# Patient Record
Sex: FEMALE | Race: ASIAN | NOT HISPANIC OR LATINO | URBAN - METROPOLITAN AREA
[De-identification: names, ages, dates, MRNs, and addresses within clinical notes are randomized per-mention and may not be internally consistent; named-entity substitution may affect disease eponyms.]

---

## 2017-12-11 ENCOUNTER — INPATIENT (INPATIENT)
Facility: HOSPITAL | Age: 22
LOS: 1 days | Discharge: HOME | End: 2017-12-13
Attending: OBSTETRICS & GYNECOLOGY | Admitting: OBSTETRICS & GYNECOLOGY
Payer: MEDICAID

## 2017-12-11 VITALS
DIASTOLIC BLOOD PRESSURE: 50 MMHG | HEART RATE: 92 BPM | TEMPERATURE: 98 F | RESPIRATION RATE: 16 BRPM | SYSTOLIC BLOOD PRESSURE: 92 MMHG | OXYGEN SATURATION: 100 %

## 2017-12-11 LAB
ALBUMIN SERPL ELPH-MCNC: 4.2 G/DL — SIGNIFICANT CHANGE UP (ref 3.3–5)
ALP SERPL-CCNC: 39 U/L — LOW (ref 40–120)
ALT FLD-CCNC: 7 U/L — SIGNIFICANT CHANGE UP (ref 4–33)
APPEARANCE UR: CLEAR — SIGNIFICANT CHANGE UP
APTT BLD: 27.5 SEC — SIGNIFICANT CHANGE UP (ref 27.5–37.4)
AST SERPL-CCNC: 16 U/L — SIGNIFICANT CHANGE UP (ref 4–32)
BACTERIA # UR AUTO: SIGNIFICANT CHANGE UP
BASOPHILS # BLD AUTO: 0.01 K/UL — SIGNIFICANT CHANGE UP (ref 0–0.2)
BASOPHILS NFR BLD AUTO: 0.1 % — SIGNIFICANT CHANGE UP (ref 0–2)
BILIRUB SERPL-MCNC: < 0.2 MG/DL — LOW (ref 0.2–1.2)
BILIRUB UR-MCNC: NEGATIVE — SIGNIFICANT CHANGE UP
BLD GP AB SCN SERPL QL: NEGATIVE — SIGNIFICANT CHANGE UP
BLOOD UR QL VISUAL: NEGATIVE — SIGNIFICANT CHANGE UP
BUN SERPL-MCNC: 3 MG/DL — LOW (ref 7–23)
CALCIUM SERPL-MCNC: 8.7 MG/DL — SIGNIFICANT CHANGE UP (ref 8.4–10.5)
CHLORIDE SERPL-SCNC: 101 MMOL/L — SIGNIFICANT CHANGE UP (ref 98–107)
CO2 SERPL-SCNC: 24 MMOL/L — SIGNIFICANT CHANGE UP (ref 22–31)
COLOR SPEC: YELLOW — SIGNIFICANT CHANGE UP
CREAT SERPL-MCNC: 0.54 MG/DL — SIGNIFICANT CHANGE UP (ref 0.5–1.3)
EOSINOPHIL # BLD AUTO: 0.06 K/UL — SIGNIFICANT CHANGE UP (ref 0–0.5)
EOSINOPHIL NFR BLD AUTO: 0.7 % — SIGNIFICANT CHANGE UP (ref 0–6)
GLUCOSE SERPL-MCNC: 120 MG/DL — HIGH (ref 70–99)
GLUCOSE UR-MCNC: 50 — HIGH
HCG SERPL-ACNC: SIGNIFICANT CHANGE UP MIU/ML
HCT VFR BLD CALC: 25.4 % — LOW (ref 34.5–45)
HGB BLD-MCNC: 8.5 G/DL — LOW (ref 11.5–15.5)
HYALINE CASTS # UR AUTO: SIGNIFICANT CHANGE UP (ref 0–?)
IMM GRANULOCYTES # BLD AUTO: 0.03 # — SIGNIFICANT CHANGE UP
IMM GRANULOCYTES NFR BLD AUTO: 0.3 % — SIGNIFICANT CHANGE UP (ref 0–1.5)
INR BLD: 0.91 — SIGNIFICANT CHANGE UP (ref 0.88–1.17)
KETONES UR-MCNC: NEGATIVE — SIGNIFICANT CHANGE UP
LEUKOCYTE ESTERASE UR-ACNC: NEGATIVE — SIGNIFICANT CHANGE UP
LYMPHOCYTES # BLD AUTO: 1 K/UL — SIGNIFICANT CHANGE UP (ref 1–3.3)
LYMPHOCYTES # BLD AUTO: 10.9 % — LOW (ref 13–44)
MCHC RBC-ENTMCNC: 29.8 PG — SIGNIFICANT CHANGE UP (ref 27–34)
MCHC RBC-ENTMCNC: 33.5 % — SIGNIFICANT CHANGE UP (ref 32–36)
MCV RBC AUTO: 89.1 FL — SIGNIFICANT CHANGE UP (ref 80–100)
MONOCYTES # BLD AUTO: 0.42 K/UL — SIGNIFICANT CHANGE UP (ref 0–0.9)
MONOCYTES NFR BLD AUTO: 4.6 % — SIGNIFICANT CHANGE UP (ref 2–14)
MUCOUS THREADS # UR AUTO: SIGNIFICANT CHANGE UP
NEUTROPHILS # BLD AUTO: 7.63 K/UL — HIGH (ref 1.8–7.4)
NEUTROPHILS NFR BLD AUTO: 83.4 % — HIGH (ref 43–77)
NITRITE UR-MCNC: NEGATIVE — SIGNIFICANT CHANGE UP
NON-SQ EPI CELLS # UR AUTO: <1 — SIGNIFICANT CHANGE UP
NRBC # FLD: 0 — SIGNIFICANT CHANGE UP
PH UR: 6.5 — SIGNIFICANT CHANGE UP (ref 4.6–8)
PLATELET # BLD AUTO: 143 K/UL — LOW (ref 150–400)
PMV BLD: 12.4 FL — SIGNIFICANT CHANGE UP (ref 7–13)
POTASSIUM SERPL-MCNC: 3.6 MMOL/L — SIGNIFICANT CHANGE UP (ref 3.5–5.3)
POTASSIUM SERPL-SCNC: 3.6 MMOL/L — SIGNIFICANT CHANGE UP (ref 3.5–5.3)
PROT SERPL-MCNC: 6.3 G/DL — SIGNIFICANT CHANGE UP (ref 6–8.3)
PROT UR-MCNC: 20 MG/DL — SIGNIFICANT CHANGE UP
PROTHROM AB SERPL-ACNC: 10.1 SEC — SIGNIFICANT CHANGE UP (ref 9.8–13.1)
RBC # BLD: 2.85 M/UL — LOW (ref 3.8–5.2)
RBC # FLD: 13.1 % — SIGNIFICANT CHANGE UP (ref 10.3–14.5)
RBC CASTS # UR COMP ASSIST: SIGNIFICANT CHANGE UP (ref 0–?)
RH IG SCN BLD-IMP: POSITIVE — SIGNIFICANT CHANGE UP
SODIUM SERPL-SCNC: 140 MMOL/L — SIGNIFICANT CHANGE UP (ref 135–145)
SP GR SPEC: 1.01 — SIGNIFICANT CHANGE UP (ref 1–1.04)
SQUAMOUS # UR AUTO: SIGNIFICANT CHANGE UP
UROBILINOGEN FLD QL: NORMAL MG/DL — SIGNIFICANT CHANGE UP
WBC # BLD: 9.15 K/UL — SIGNIFICANT CHANGE UP (ref 3.8–10.5)
WBC # FLD AUTO: 9.15 K/UL — SIGNIFICANT CHANGE UP (ref 3.8–10.5)
WBC UR QL: SIGNIFICANT CHANGE UP (ref 0–?)

## 2017-12-11 PROCEDURE — 76830 TRANSVAGINAL US NON-OB: CPT | Mod: 26

## 2017-12-11 RX ORDER — SODIUM CHLORIDE 9 MG/ML
1000 INJECTION INTRAMUSCULAR; INTRAVENOUS; SUBCUTANEOUS ONCE
Qty: 0 | Refills: 0 | Status: COMPLETED | OUTPATIENT
Start: 2017-12-11 | End: 2017-12-11

## 2017-12-11 RX ADMIN — SODIUM CHLORIDE 1000 MILLILITER(S): 9 INJECTION INTRAMUSCULAR; INTRAVENOUS; SUBCUTANEOUS at 21:09

## 2017-12-11 NOTE — ED ADULT NURSE NOTE - OBJECTIVE STATEMENT
Pt received in room 4, chinese speaking only. Pt comes in s/p syncope today, with c/o LLQ pain that began after the syncope. Pt appears in no acute distress, vs as noted and reports that she has 3/10 pain. Pt denies hitting head or falling to floor, was caught by family member before hitting the ground. IV access placed to RAC 20G and NS infusing per order. Labs sent, awaiting urine sample and pt awaiting TVUS. Will monitor and await further orders.

## 2017-12-11 NOTE — ED PROVIDER NOTE - CRITICAL CARE PROVIDED
direct patient care (not related to procedure)/documentation/interpretation of diagnostic studies/consult w/ pt's family directly relating to pts condition/additional history taking/consultation with other physicians

## 2017-12-11 NOTE — ED PROVIDER NOTE - MEDICAL DECISION MAKING DETAILS
23 yo F here for pelvic pain, bleeding, currently 8 weeks pregnant. Pt declining pelvic exam in ED but is amendable to TVUS. Will obtain labs, urine, TVUS

## 2017-12-11 NOTE — ED PROVIDER NOTE - OBJECTIVE STATEMENT
23 yo F  currently 8 weeks pregnant, here for vaginal bleeding, L sided pelvic pain x 2 days. Pt requesting family member to translate at bedside. Reports over past two days patient has had vaginal bleeding, approx same as period with occasional clots. Reports this morning was standing washing dishes and felt like she was going to pass out so laid on floor but no LOC. pt reports GYN in [place] Dr. Cobb. Denies fever chills vomiting diarrhea cp sob Ha dizziness dysuria hematuria weakness

## 2017-12-11 NOTE — ED ADULT TRIAGE NOTE - CHIEF COMPLAINT QUOTE
approx 8 weeks pregnant with c/o lower abdominal pain with vaginal bleeding today, denies any clots. States bleeding initially started on Friday on and off and resolved until today.

## 2017-12-11 NOTE — ED PROVIDER NOTE - SHIFT CHANGE DETAILS
f/u repeat CBC, continue to monitor vital signs, f/u GYn attending evaluation, and transfuse if necessary.

## 2017-12-11 NOTE — ED PROVIDER NOTE - CARE PLAN
Principal Discharge DX:	Subchorionic hematoma in first trimester, single or unspecified fetus  Secondary Diagnosis:	Vaginal bleeding  Secondary Diagnosis:	Hypotension, unspecified hypotension type

## 2017-12-11 NOTE — ED PROVIDER NOTE - ATTENDING CONTRIBUTION TO CARE
Joseph: 21 yo female  approx 8 weeks pregnant s/p syncope earlier today. No head trauma. Pt also c/o intermittent vaginal bleeding x 1 month. +lower abdominal cramping. No associated, dysuria, fever or chills. No flank pain. +confirmed IUP on US in OB office 3 days ago. Exam: + LLQ TTP, no CVA TTP, PLan: cbc, cmp, hcg, type and screen, u/a, transvaginal ultrasound.

## 2017-12-11 NOTE — CONSULT NOTE ADULT - SUBJECTIVE AND OBJECTIVE BOX
22y  LMP September, unknown EDC (states she is 8 weeks GA) presents to ED after near-syncopal episode at home while washing dishes. States no LOC. Occasional abdominal pain, however none currently. States she has VB intermittently, states it is minimal. Feels weakness for 2 months. Patient had confirmed IUP w/Dr. Cobb in [place].  Denies N/V/HA/SOB.       OB/GYN HISTORY: MAB 2017 w/D+C, Denies GYN history  PAST MEDICAL & SURGICAL HISTORY:  No pertinent past medical history  No significant past surgical history    Allergies: None         Vital Signs Last 24 Hrs  T(C): 36.7 (11 Dec 2017 19:44), Max: 36.7 (11 Dec 2017 19:44)  T(F): 98.1 (11 Dec 2017 19:44), Max: 98.1 (11 Dec 2017 19:44)  HR: 84 (11 Dec 2017 20:48) (84 - 92)  BP: 98/50 (11 Dec 2017 20:48) (92/50 - 98/50)  BP(mean): --  RR: 16 (11 Dec 2017 20:48) (16 - 16)  SpO2: 100% (11 Dec 2017 20:48) (100% - 100%)    PHYSICAL EXAM:      Constitutional: alert and oriented x 3  Respiratory: clear  Cardiovascular: regular rate and rhythm  Gastrointestinal: soft, non tender, + bowel sounds. No hepatosplenomegaly, no palpable masses  Cervix: closed/ long, no CMT  Uterus: normal size, non tender  Adnexa: non tender, no palpable masses        LABS:                        8.5    9.15  )-----------( 143      ( 11 Dec 2017 21:05 )             25.4     12-11    140  |  101  |  3<L>  ----------------------------<  120<H>  3.6   |  24  |  0.54    Ca    8.7      11 Dec 2017 21:05    TPro  6.3  /  Alb  4.2  /  TBili  < 0.2<L>  /  DBili  x   /  AST  16  /  ALT  7   /  AlkPhos  39<L>  12-11      Urinalysis Basic - ( 11 Dec 2017 21:15 )    Color: YELLOW / Appearance: CLEAR / S.015 / pH: 6.5  Gluc: 50 / Ketone: NEGATIVE  / Bili: NEGATIVE / Urobili: NORMAL mg/dL   Blood: NEGATIVE / Protein: 20 mg/dL / Nitrite: NEGATIVE   Leuk Esterase: NEGATIVE / RBC: 0-2 / WBC 2-5   Sq Epi: OCC / Non Sq Epi: x / Bacteria: FEW        Blood Type: B Positive      RADIOLOGY & ADDITIONAL STUDIES:  Awaiting official read: large subchorionic hematoma noted. 22y  LMP 17 (10week,3days),presents to ED after near-syncopal episode at home while washing dishes. States no LOC. Occasional abdominal pain, however none currently. States she has VB intermittently, states it is minimal. Feels weakness for 2 months. Patient had confirmed IUP w/Dr. Cobb in [place].  Denies N/V/HA/SOB.       OB/GYN HISTORY: MAB 2017 w/D+C, Denies GYN history  PAST MEDICAL & SURGICAL HISTORY:  No pertinent past medical history  No significant past surgical history    Allergies: None         Vital Signs Last 24 Hrs  T(C): 36.7 (11 Dec 2017 19:44), Max: 36.7 (11 Dec 2017 19:44)  T(F): 98.1 (11 Dec 2017 19:44), Max: 98.1 (11 Dec 2017 19:44)  HR: 84 (11 Dec 2017 20:48) (84 - 92)  BP: 98/50 (11 Dec 2017 20:48) (92/50 - 98/50)  BP(mean): --  RR: 16 (11 Dec 2017 20:48) (16 - 16)  SpO2: 100% (11 Dec 2017 20:48) (100% - 100%)    PHYSICAL EXAM:      Constitutional: alert and oriented x 3  Respiratory: clear  Cardiovascular: regular rate and rhythm  Gastrointestinal: soft, non tender, + bowel sounds. No hepatosplenomegaly, no palpable masses  Cervix: closed/ long, no CMT  Uterus: normal size, non tender  Adnexa: non tender, no palpable masses        LABS:                        8.5    9.15  )-----------( 143      ( 11 Dec 2017 21:05 )             25.4     12-11    140  |  101  |  3<L>  ----------------------------<  120<H>  3.6   |  24  |  0.54    Ca    8.7      11 Dec 2017 21:05    TPro  6.3  /  Alb  4.2  /  TBili  < 0.2<L>  /  DBili  x   /  AST  16  /  ALT  7   /  AlkPhos  39<L>  12-11      Urinalysis Basic - ( 11 Dec 2017 21:15 )    Color: YELLOW / Appearance: CLEAR / S.015 / pH: 6.5  Gluc: 50 / Ketone: NEGATIVE  / Bili: NEGATIVE / Urobili: NORMAL mg/dL   Blood: NEGATIVE / Protein: 20 mg/dL / Nitrite: NEGATIVE   Leuk Esterase: NEGATIVE / RBC: 0-2 / WBC 2-5   Sq Epi: OCC / Non Sq Epi: x / Bacteria: FEW        Blood Type: B Positive      RADIOLOGY & ADDITIONAL STUDIES:  Awaiting official read: large subchorionic hematoma noted.

## 2017-12-11 NOTE — ED PROVIDER NOTE - CHPI ED SYMPTOMS NEG
no vomiting/no fever/no chills/no decreased eating/drinking/no tingling/no dizziness/no nausea/no numbness/no weakness

## 2017-12-11 NOTE — CONSULT NOTE ADULT - ASSESSMENT
A/P   22y   @8wks GA   presents with c/o near syncopal episode, vaginal bleeding w/low H/H.  Differential  includes threatened AB vs. viable IUP.    -Subchorionic Hematoma noted. Pelvic rest. Nothing per vagina  -Rh type:   B+  No need for rhogam at this time.   -Repeat CBC to determine stability  -Follow up w/GYN     JOSH Jay, PGY2  D/W Dr. Pack A/P   22y  @10wks GA   presents with c/o near syncopal episode, vaginal bleeding w/low H/H.  Differential  includes threatened AB vs. viable IUP.    -Subchorionic Hematoma noted. Pelvic rest. Nothing per vagina  -Rh type:   B+  No need for rhogam at this time.   -Repeat CBC to determine stability  -Follow up w/GYN     JOSH Jay, PGY2  D/W Dr. Pack

## 2017-12-12 DIAGNOSIS — O41.8X10 OTHER SPECIFIED DISORDERS OF AMNIOTIC FLUID AND MEMBRANES, FIRST TRIMESTER, NOT APPLICABLE OR UNSPECIFIED: ICD-10-CM

## 2017-12-12 DIAGNOSIS — N93.9 ABNORMAL UTERINE AND VAGINAL BLEEDING, UNSPECIFIED: ICD-10-CM

## 2017-12-12 LAB
HCT VFR BLD CALC: 24.5 % — LOW (ref 34.5–45)
HCT VFR BLD CALC: 25.5 % — LOW (ref 34.5–45)
HCT VFR BLD CALC: 35.6 % — SIGNIFICANT CHANGE UP (ref 34.5–45)
HGB BLD-MCNC: 12 G/DL — SIGNIFICANT CHANGE UP (ref 11.5–15.5)
HGB BLD-MCNC: 8.2 G/DL — LOW (ref 11.5–15.5)
HGB BLD-MCNC: 8.7 G/DL — LOW (ref 11.5–15.5)
MCHC RBC-ENTMCNC: 29.6 PG — SIGNIFICANT CHANGE UP (ref 27–34)
MCHC RBC-ENTMCNC: 30 PG — SIGNIFICANT CHANGE UP (ref 27–34)
MCHC RBC-ENTMCNC: 30.1 PG — SIGNIFICANT CHANGE UP (ref 27–34)
MCHC RBC-ENTMCNC: 33.5 % — SIGNIFICANT CHANGE UP (ref 32–36)
MCHC RBC-ENTMCNC: 33.7 % — SIGNIFICANT CHANGE UP (ref 32–36)
MCHC RBC-ENTMCNC: 34.1 % — SIGNIFICANT CHANGE UP (ref 32–36)
MCV RBC AUTO: 87.9 FL — SIGNIFICANT CHANGE UP (ref 80–100)
MCV RBC AUTO: 88.2 FL — SIGNIFICANT CHANGE UP (ref 80–100)
MCV RBC AUTO: 89.7 FL — SIGNIFICANT CHANGE UP (ref 80–100)
NRBC # FLD: 0 — SIGNIFICANT CHANGE UP
PLATELET # BLD AUTO: 109 K/UL — LOW (ref 150–400)
PLATELET # BLD AUTO: 141 K/UL — LOW (ref 150–400)
PLATELET # BLD AUTO: 157 K/UL — SIGNIFICANT CHANGE UP (ref 150–400)
PMV BLD: 12 FL — SIGNIFICANT CHANGE UP (ref 7–13)
PMV BLD: 12 FL — SIGNIFICANT CHANGE UP (ref 7–13)
PMV BLD: 12.5 FL — SIGNIFICANT CHANGE UP (ref 7–13)
RBC # BLD: 2.73 M/UL — LOW (ref 3.8–5.2)
RBC # BLD: 2.89 M/UL — LOW (ref 3.8–5.2)
RBC # BLD: 4.05 M/UL — SIGNIFICANT CHANGE UP (ref 3.8–5.2)
RBC # FLD: 13.2 % — SIGNIFICANT CHANGE UP (ref 10.3–14.5)
RBC # FLD: 13.7 % — SIGNIFICANT CHANGE UP (ref 10.3–14.5)
RBC # FLD: 13.9 % — SIGNIFICANT CHANGE UP (ref 10.3–14.5)
RH IG SCN BLD-IMP: POSITIVE — SIGNIFICANT CHANGE UP
WBC # BLD: 10.33 K/UL — SIGNIFICANT CHANGE UP (ref 3.8–10.5)
WBC # BLD: 7.13 K/UL — SIGNIFICANT CHANGE UP (ref 3.8–10.5)
WBC # BLD: 9.88 K/UL — SIGNIFICANT CHANGE UP (ref 3.8–10.5)
WBC # FLD AUTO: 10.33 K/UL — SIGNIFICANT CHANGE UP (ref 3.8–10.5)
WBC # FLD AUTO: 7.13 K/UL — SIGNIFICANT CHANGE UP (ref 3.8–10.5)
WBC # FLD AUTO: 9.88 K/UL — SIGNIFICANT CHANGE UP (ref 3.8–10.5)

## 2017-12-12 PROCEDURE — 99232 SBSQ HOSP IP/OBS MODERATE 35: CPT | Mod: GC

## 2017-12-12 RX ORDER — SODIUM CHLORIDE 9 MG/ML
1000 INJECTION INTRAMUSCULAR; INTRAVENOUS; SUBCUTANEOUS ONCE
Qty: 0 | Refills: 0 | Status: COMPLETED | OUTPATIENT
Start: 2017-12-12 | End: 2017-12-12

## 2017-12-12 RX ORDER — DIPHENHYDRAMINE HCL 50 MG
25 CAPSULE ORAL ONCE
Qty: 0 | Refills: 0 | Status: COMPLETED | OUTPATIENT
Start: 2017-12-12 | End: 2017-12-12

## 2017-12-12 RX ORDER — INFLUENZA VIRUS VACCINE 15; 15; 15; 15 UG/.5ML; UG/.5ML; UG/.5ML; UG/.5ML
0.5 SUSPENSION INTRAMUSCULAR ONCE
Qty: 0 | Refills: 0 | Status: DISCONTINUED | OUTPATIENT
Start: 2017-12-12 | End: 2017-12-13

## 2017-12-12 RX ORDER — ACETAMINOPHEN 500 MG
975 TABLET ORAL ONCE
Qty: 0 | Refills: 0 | Status: COMPLETED | OUTPATIENT
Start: 2017-12-12 | End: 2017-12-12

## 2017-12-12 RX ORDER — SODIUM CHLORIDE 9 MG/ML
1000 INJECTION, SOLUTION INTRAVENOUS
Qty: 0 | Refills: 0 | Status: DISCONTINUED | OUTPATIENT
Start: 2017-12-12 | End: 2017-12-12

## 2017-12-12 RX ORDER — ACETAMINOPHEN 500 MG
650 TABLET ORAL EVERY 6 HOURS
Qty: 0 | Refills: 0 | Status: DISCONTINUED | OUTPATIENT
Start: 2017-12-12 | End: 2017-12-13

## 2017-12-12 RX ADMIN — SODIUM CHLORIDE 1000 MILLILITER(S): 9 INJECTION INTRAMUSCULAR; INTRAVENOUS; SUBCUTANEOUS at 00:12

## 2017-12-12 RX ADMIN — SODIUM CHLORIDE 1000 MILLILITER(S): 9 INJECTION INTRAMUSCULAR; INTRAVENOUS; SUBCUTANEOUS at 01:31

## 2017-12-12 RX ADMIN — SODIUM CHLORIDE 125 MILLILITER(S): 9 INJECTION, SOLUTION INTRAVENOUS at 02:25

## 2017-12-12 RX ADMIN — Medication 25 MILLIGRAM(S): at 03:53

## 2017-12-12 RX ADMIN — Medication 975 MILLIGRAM(S): at 03:53

## 2017-12-12 NOTE — DISCHARGE NOTE ADULT - PATIENT PORTAL LINK FT
“You can access the [facility] Patient Portal, offered by Health system, by registering with the following website: [email or web address]”

## 2017-12-12 NOTE — H&P ADULT - HISTORY OF PRESENT ILLNESS
22y  LMP 17 (10week,3days),presents to ED after near-syncopal episode at home while washing dishes. States no LOC. Occasional abdominal pain, however none currently. States she has VB intermittently, states it is minimal. Has felt weakness for 2 months. Patient had confirmed IUP w/Dr. Cobb in [place].  Denies N/V/HA/SOB.     While in ED, patient saturated 2 pads and was hypotensive.    OB/GYN HISTORY: eTOP 2017 w/D+C, Denies GYN history  PAST MEDICAL & SURGICAL HISTORY:  No pertinent past medical history  D+C x 1    Allergies: None

## 2017-12-12 NOTE — DISCHARGE NOTE ADULT - INSTRUCTIONS
drink 9-13 eight oz. glasses of fluid daily. call md for follow up appt. call md for return of symptoms or go to the ER for emergency. regular

## 2017-12-12 NOTE — H&P ADULT - NSHPPHYSICALEXAM_GEN_ALL_CORE
Constitutional: alert and oriented x 3  Respiratory: clear  Cardiovascular: regular rate and rhythm  Gastrointestinal: soft, non tender, + bowel sounds. No hepatosplenomegaly, no palpable masses  Cervix: closed/ long, no CMT, no active bleeding at vault  Uterus: normal size, non tender  Adnexa: non tender, no palpable masses

## 2017-12-12 NOTE — DISCHARGE NOTE ADULT - CARE PLAN
Principal Discharge DX:	Vaginal bleeding  Goal:	Recovery  Instructions for follow-up, activity and diet:	We advise that you remain on modified bed rest with limited physical activity given the large hematoma.  We also advise pelvic rest - nothing in the vagina including tampons, douching, sexual intercourse- until cleared by your OBGYN.  This is all to ensure that the hematoma does not worsen and you do not lose the pregnancy.    Regular diet.    Follow up with your OBGYN within one to two weeks.  Secondary Diagnosis:	Subchorionic hematoma in first trimester, single or unspecified fetus  Goal:	as above  Instructions for follow-up, activity and diet:	as above

## 2017-12-12 NOTE — DISCHARGE NOTE ADULT - HOSPITAL COURSE
Patient presented to ED w/ vaginal bleeding at 10wks pregnant.  Found on ultrasound to have a large subchorionic hematoma.  Vaginal bleeding saturated 2 pads and patient became hypotensive w/ dizziness.  Decision made to admit for transfusion.  Hct 25.4->24.5-> 1 U PRBC->    Patient clinically improved.  Patient given instructions for modified bed rest and pelvic rest until cleared by her OBGYN.  Patient expressed understanding.  Patient stable for discharge home with OBGYN follow up. Patient presented to ED w/ vaginal bleeding at 10wks pregnant.  Found on ultrasound to have a large subchorionic hematoma.  Vaginal bleeding saturated 2 pads and patient became hypotensive w/ dizziness.  Decision made to admit for transfusion.  Hct 25.4->24.5-> 1 U PRBC-> 25.5->1u PRBC->35.6    Patient clinically improved.  Patient given instructions for modified bed rest and pelvic rest until cleared by her OBGYN.  Patient expressed understanding.  Patient stable for discharge home with OBGYN follow up.

## 2017-12-12 NOTE — H&P ADULT - ASSESSMENT
22y  LMP  @10.3 GA  presents after near syncopal episode found to have  large subchorionic hematoma w/vaginal bleeding, low H/H and hypotension.  Concern for threatened .    -Will admit to GYN for 1uPRBC and Observation  -Monitor Vitals Closely  -Serial abdominal exams    JOSH Jay, PGY2  Seen w/Dr. Garibay

## 2017-12-12 NOTE — PROGRESS NOTE ADULT - PROBLEM SELECTOR PLAN 1
-per patient this was unplanned pregnancy however this pregnancy is desired and she does not wish to terminate.  Will continue to monitor closely.

## 2017-12-12 NOTE — CHART NOTE - NSCHARTNOTEFT_GEN_A_CORE
R2 OB Update     Patient H/H returend without appropriate rise.  Went to see patient at bedside.  Stated she feels improved from admission.  Denies CP/SOB, lightheadness, dizziness.  Has been out of bed to use the bathroom without difficulty.  States she changed two pads today- not soaked.    On exam the pad had only a small amount of blood however patient stated she just changed it about twenty mins ago.   Concern is if patient is not bleeding vaginally then she may be bleeding into the hematoma and worsening it.  Also possible patient hemoconcentrated.   Patient currently hemodynamically stable with stable vitals.     Plan  -will continue pad counts  -will transfuse another 1 u PRBC   -will continue to monitor closely  -f/u 4 hour post transfusion CBC     Jennifer Bowling PGY-2   d/w Dr. Lombardo

## 2017-12-12 NOTE — ED ADULT NURSE REASSESSMENT NOTE - NS ED NURSE REASSESS COMMENT FT1
pt blood pressure low. JO LOPES at bedside. repeat cbc drawn and sent. vitals noted. family at bedside. pt denies dizziness currently. will monitor.

## 2017-12-12 NOTE — DISCHARGE NOTE ADULT - PLAN OF CARE
Recovery We advise that you remain on modified bed rest with limited physical activity given the large hematoma.  We also advise pelvic rest - nothing in the vagina including tampons, douching, sexual intercourse- until cleared by your OBGYN.  This is all to ensure that the hematoma does not worsen and you do not lose the pregnancy.    Regular diet.    Follow up with your OBGYN within one to two weeks. as above

## 2017-12-12 NOTE — H&P ADULT - NSHPLABSRESULTS_GEN_ALL_CORE
Vital Signs Last 24 Hrs  T(C): 36.7 (11 Dec 2017 19:44), Max: 36.7 (11 Dec 2017 19:44)  T(F): 98.1 (11 Dec 2017 19:44), Max: 98.1 (11 Dec 2017 19:44)  HR: 89 (12 Dec 2017 00:49) (81 - 92)  BP: 100/66 (12 Dec 2017 00:49) (85/45 - 100/66)  BP(mean): --  RR: 16 (12 Dec 2017 00:49) (16 - 16)  SpO2: 100% (12 Dec 2017 00:49) (100% - 100%)    I&O's Detail                            8.2    10.33 )-----------( 157      (  @ 00:10 )             24.5                8.5    9.15  )-----------( 143      (  @ 21:05 )             25.4           140  |  101  |  3<L>  ----------------------------<  120<H>  3.6   |  24  |  0.54    Ca    8.7      11 Dec 2017 21:05    TPro  6.3  /  Alb  4.2  /  TBili  < 0.2<L>  /  DBili  x   /  AST  16  /  ALT  7   /  AlkPhos  39<L>  12            CAPILLARY BLOOD GLUCOSE          LIVER FUNCTIONS - ( 11 Dec 2017 21:05 )  Alb: 4.2 g/dL / Pro: 6.3 g/dL / ALK PHOS: 39 u/L / ALT: 7 u/L / AST: 16 u/L / GGT: x             PT/INR - ( 11 Dec 2017 22:15 )   PT: 10.1 SEC;   INR: 0.91          PTT - ( 11 Dec 2017 22:15 )  PTT:27.5 SEC    Urinalysis Basic - ( 11 Dec 2017 21:15 )    Color: YELLOW / Appearance: CLEAR / S.015 / pH: 6.5  Gluc: 50 / Ketone: NEGATIVE  / Bili: NEGATIVE / Urobili: NORMAL mg/dL   Blood: NEGATIVE / Protein: 20 mg/dL / Nitrite: NEGATIVE   Leuk Esterase: NEGATIVE / RBC: 0-2 / WBC 2-5   Sq Epi: OCC / Non Sq Epi: x / Bacteria: FEW    < from: US Transvaginal (12.11.17 @ 21:50) >    Single live intrauterine gestation. There is a large heterogeneous   collection with peripheral echogenic fluid likely representing blood   products of varying age surrounding the gestational sac. This is   concerning for a subchorionic hemorrhage which is at least 4 times the   size of the gestational sac and surrounds at least 90% of the sac   circumference.     Gestational sac: Small for dates.  Crown Rump Length: 4.0 cm   Estimated Gestational Age:10 weeks and 6 days  Yolk Sac: Normal.  Fetal Heart Rate: 176 bpm.    Right ovary: Not visualized.   Left ovary: Visualized only transabdominally and measures 3.3 x 2.3 x 2.6   cm and contains a 2.0 cm corpus luteal cyst.   Free fluid:None.    IMPRESSION:    Single live intrauterine pregnancy however the prognosis is poor given   the surrounding a large subchorionic hematoma. Close follow-up is   recommended.    Estimated gestational age (based on CRL) of  10 weeks and 6 days.  Estimated due date of  2018.    < end of copied text >

## 2017-12-12 NOTE — DISCHARGE NOTE ADULT - CONDITIONS AT DISCHARGE
pt ambulating, eating, voiding without difficulty. iv discontinued. no distress noted. lab work stable.

## 2017-12-12 NOTE — PROGRESS NOTE ADULT - PROBLEM SELECTOR PLAN 2
Neuro: PO pain meds prn for abdominal cramping  CV: hemodynamically stable.  Hct 25.4->24.5->1uPRBC.  will follow up post-transfusion CBC   Pulm: saturating well on RA   GI: regular diet   : voiding spontanously      -vaginal bleeding: currently improved.  will continue pad counts  Heme: SCD's while in bed.  Ambulation once patient feels improved.   Endo: no active issues  FEN: LR@125  ID: afebrile  Dispo: continue inpatient care.  dispo planning following post transfusion CBC    Jennifer Bowling PGY_2   Patient seen with GYN team

## 2017-12-12 NOTE — PROGRESS NOTE ADULT - ASSESSMENT
22y  LMP  @10.4 GA  admitted with symptomatic anemia due to vaginal bleeding caused by large subchorionic hematoma w/ live IUP.  Currently hemodynamically stable and receiving 1uPRBC.

## 2017-12-12 NOTE — PROGRESS NOTE ADULT - SUBJECTIVE AND OBJECTIVE BOX
R2 GYN Progress Note  HD#1     # [id number]    Patient seen and examined at bedside.  No acute events overnight. No acute complaints.  Patient reports some mild abdominal cramping overnight.  She reports minimal bleeding overnight- states she did not even change one pad.  States she still feels dizzy- ambulated once overnight to the bathroom with assistance.     Denies CP, SOB, N/V, fevers, and chills.    Vital Signs Last 24 Hours  T(C): 36.8 (12-12-17 @ 05:45), Max: 37.1 (12-12-17 @ 02:24)  HR: 75 (12-12-17 @ 05:45) (75 - 92)  BP: 95/55 (12-12-17 @ 05:45) (85/45 - 100/66)  RR: 17 (12-12-17 @ 05:45) (16 - 17)  SpO2: 100% (12-12-17 @ 05:45) (100% - 100%)    I&O's Summary    11 Dec 2017 07:01  -  12 Dec 2017 06:47  --------------------------------------------------------  IN: 0 mL / OUT: 400 mL / NET: -400 mL        Physical Exam:  General: NAD  CV: RR, S1, S2, no M/R/G  Lungs: CTA b/l, good air flow b/l   Abdomen: Soft, nontender, non distended, bowel sounds present  : 1/4 of pad saturated with old blood  Ext: No pain or swelling     Labs:                        8.2    10.33 )-----------( 157      ( 12 Dec 2017 00:10 )             24.5   baso x      eos x      imm gran x      lymph x      mono x      poly x                            8.5    9.15  )-----------( 143      ( 11 Dec 2017 21:05 )             25.4   baso 0.1    eos 0.7    imm gran 0.3    lymph 10.9   mono 4.6    poly 83.4       MEDICATIONS  (STANDING):  influenza   Vaccine 0.5 milliLiter(s) IntraMuscular once  lactated ringers. 1000 milliLiter(s) (125 mL/Hr) IV Continuous <Continuous>    MEDICATIONS  (PRN):

## 2017-12-12 NOTE — DISCHARGE NOTE ADULT - NS AS ACTIVITY OBS
Showering allowed/Walking-Indoors allowed/Stairs allowed/Walking-Outdoors allowed/No Heavy lifting/straining

## 2017-12-13 VITALS
SYSTOLIC BLOOD PRESSURE: 90 MMHG | TEMPERATURE: 98 F | DIASTOLIC BLOOD PRESSURE: 50 MMHG | HEART RATE: 72 BPM | RESPIRATION RATE: 16 BRPM | OXYGEN SATURATION: 100 %

## 2017-12-13 LAB
BACTERIA UR CULT: SIGNIFICANT CHANGE UP
HCT VFR BLD CALC: 34.3 % — LOW (ref 34.5–45)
HGB BLD-MCNC: 11.8 G/DL — SIGNIFICANT CHANGE UP (ref 11.5–15.5)
MCHC RBC-ENTMCNC: 29.9 PG — SIGNIFICANT CHANGE UP (ref 27–34)
MCHC RBC-ENTMCNC: 34.4 % — SIGNIFICANT CHANGE UP (ref 32–36)
MCV RBC AUTO: 87.1 FL — SIGNIFICANT CHANGE UP (ref 80–100)
NRBC # FLD: 0 — SIGNIFICANT CHANGE UP
PLATELET # BLD AUTO: 128 K/UL — LOW (ref 150–400)
PMV BLD: 11.6 FL — SIGNIFICANT CHANGE UP (ref 7–13)
RBC # BLD: 3.94 M/UL — SIGNIFICANT CHANGE UP (ref 3.8–5.2)
RBC # FLD: 13.9 % — SIGNIFICANT CHANGE UP (ref 10.3–14.5)
SPECIMEN SOURCE: SIGNIFICANT CHANGE UP
WBC # BLD: 9.11 K/UL — SIGNIFICANT CHANGE UP (ref 3.8–10.5)
WBC # FLD AUTO: 9.11 K/UL — SIGNIFICANT CHANGE UP (ref 3.8–10.5)

## 2017-12-13 PROCEDURE — 99231 SBSQ HOSP IP/OBS SF/LOW 25: CPT | Mod: GC

## 2017-12-13 NOTE — PROGRESS NOTE ADULT - ASSESSMENT
22y  LMP  @10.5 GA  admitted with symptomatic anemia due to vaginal bleeding caused by large subchorionic hematoma w/ live IUP.  Currently hemodynamically stable and s/p 2 U pRBC with symptomatic improvement and appropriate H/H rise.

## 2017-12-13 NOTE — PROGRESS NOTE ADULT - SUBJECTIVE AND OBJECTIVE BOX
R2 GYN Progress Note  HD#2     #[id number]    Patient seen and examined at bedside.  No acute events overnight. No acute complaints.   Overnight patient completed her second unit of pRBC and her post transfusion CBC returned appropriate.  She reports feeling overall much better than on admission.   Denies CP, SOB, N/V, fevers, and chills.    Vital Signs Last 24 Hours  T(C): 36.9 (12-13-17 @ 05:15), Max: 36.9 (12-13-17 @ 05:15)  HR: 90 (12-13-17 @ 05:15) (68 - 90)  BP: 90/52 (12-13-17 @ 05:15) (88/52 - 99/63)  RR: 17 (12-13-17 @ 05:15) (16 - 18)  SpO2: 98% (12-13-17 @ 05:15) (98% - 100%)    I&O's Summary    11 Dec 2017 07:01  -  12 Dec 2017 07:00  --------------------------------------------------------  IN: 920 mL / OUT: 400 mL / NET: 520 mL    12 Dec 2017 07:01  -  13 Dec 2017 06:49  --------------------------------------------------------  IN: 0 mL / OUT: 3500 mL / NET: -3500 mL        Physical Exam:  General: NAD  CV: RR, S1, S2, no M/R/G  Lungs: CTA b/l, good air flow b/l   Abdomen: Soft, NTND, normoactive bowel sounds  : no bleeding on pad  Ext: No pain or swelling     Labs:                        12.0   9.88  )-----------( 141      ( 12 Dec 2017 23:21 )             35.6   baso x      eos x      imm gran x      lymph x      mono x      poly x                            8.7    7.13  )-----------( 109      ( 12 Dec 2017 11:20 )             25.5   baso x      eos x      imm gran x      lymph x      mono x      poly x                            8.2    10.33 )-----------( 157      ( 12 Dec 2017 00:10 )             24.5   baso x      eos x      imm gran x      lymph x      mono x      poly x                            8.5    9.15  )-----------( 143      ( 11 Dec 2017 21:05 )             25.4   baso 0.1    eos 0.7    imm gran 0.3    lymph 10.9   mono 4.6    poly 83.4       MEDICATIONS  (STANDING):  influenza   Vaccine 0.5 milliLiter(s) IntraMuscular once    MEDICATIONS  (PRN):  acetaminophen   Tablet. 650 milliGRAM(s) Oral every 6 hours PRN Mild Pain (1 - 3)

## 2017-12-13 NOTE — PROGRESS NOTE ADULT - PROBLEM SELECTOR PLAN 2
Neuro: PO pain meds prn for abdominal cramping  CV: hemodynamically stable.  Hct 25.4->24.5->1uPRBC->25.5->1uPRBC->35.6.    Pulm: saturating well on RA   GI: regular diet   : voiding spontanously      -vaginal bleeding: currently improved.  will continue pad counts  Heme: SCD's while in bed.  Ambulation once patient feels improved.   Endo: no active issues  FEN: LR@125  ID: afebrile  Dispo: discharge planning this AM    Jennifer Bowling PGY_2   Patient seen with GYN team

## 2017-12-13 NOTE — PROGRESS NOTE ADULT - PROBLEM SELECTOR PLAN 1
-FHR check this AM with +FHR at 162.   Continued demonstration on US of her large subchorionic hematoma. Patient instructed on modified bed rest and pelvic rest until cleared by her primary OBGYN to ensure no further propagation of her hematoma.

## 2022-02-15 NOTE — ED PROVIDER NOTE - PROGRESS NOTE DETAILS
51 RODRIGO caro: hbg 8.5, large subchorionic hemorrhage on TVUS 4 x size of pregnancy as per radiology. GYN consulted, will see pt. Delta Hgb ordered for 1AM (4 hours post previous). Pt denies active bleeding, will check for change RODRIGO Sanders: report from triage RN pt used 2 pads in 1 hour, saturated, no clots. Notified GYN, will send CBC now for repeat. EKG normal. Pt will be signed out over night for further management. RODRIGO Sanders: report from triage RN pt used 2 pads in 1 hour, saturated, no clots, however BP 80/50s Notified GYN, will send CBC now for repeat. EKG normal. GYN will come eval with attending. Pt will be moved to main ED for further management. Joseph: Pt with continued vaginal bleeding, feeling dizzy and BP 85 systolic. I discussed with GYn resident- requested attending presence to evaluate the patient. will move patient to the main for further care. Pt beverly dout to DR Hurt. received signout from dr wheatley. pt transferred to main ED due to persistent vaginal bleeding and hypotension. pt currently receiving 2nd L of IVF. pending evaluation by OBGYN attending. will monitor, f/u cbc, d/w obgyn attending. - Bharat Hurt MD Joseph: Pt with continued vaginal bleeding, feeling dizzy and BP 85 systolic. I discussed with GYn resident- requested attending presence to evaluate the patient. will move patient to the main for further care. Pt beverly dout to DR Hurt. GYN resident at the bedside, attending to evaluate shortly. dr rodriguez at bedside seeing patient. she requests 2u prbc, will admit patient. pt currently maintaining bp. - Bharat Hurt MD